# Patient Record
Sex: FEMALE | Race: WHITE | NOT HISPANIC OR LATINO | Employment: PART TIME | URBAN - NONMETROPOLITAN AREA
[De-identification: names, ages, dates, MRNs, and addresses within clinical notes are randomized per-mention and may not be internally consistent; named-entity substitution may affect disease eponyms.]

---

## 2017-09-07 ENCOUNTER — TRANSCRIBE ORDERS (OUTPATIENT)
Dept: RADIOLOGY | Facility: MEDICAL CENTER | Age: 52
End: 2017-09-07

## 2017-09-07 ENCOUNTER — APPOINTMENT (OUTPATIENT)
Dept: RADIOLOGY | Facility: MEDICAL CENTER | Age: 52
End: 2017-09-07
Payer: COMMERCIAL

## 2017-09-07 DIAGNOSIS — M79.672 FOOT PAIN, LEFT: ICD-10-CM

## 2017-09-07 DIAGNOSIS — M79.672 FOOT PAIN, LEFT: Primary | ICD-10-CM

## 2017-09-07 PROCEDURE — 73630 X-RAY EXAM OF FOOT: CPT

## 2023-05-19 ENCOUNTER — OFFICE VISIT (OUTPATIENT)
Dept: URGENT CARE | Facility: CLINIC | Age: 58
End: 2023-05-19

## 2023-05-19 VITALS
RESPIRATION RATE: 18 BRPM | TEMPERATURE: 99 F | SYSTOLIC BLOOD PRESSURE: 145 MMHG | OXYGEN SATURATION: 96 % | HEART RATE: 72 BPM | DIASTOLIC BLOOD PRESSURE: 92 MMHG

## 2023-05-19 DIAGNOSIS — N30.01 ACUTE CYSTITIS WITH HEMATURIA: ICD-10-CM

## 2023-05-19 DIAGNOSIS — R35.0 URINARY FREQUENCY: Primary | ICD-10-CM

## 2023-05-19 LAB
SL AMB  POCT GLUCOSE, UA: NEGATIVE
SL AMB LEUKOCYTE ESTERASE,UA: ABNORMAL
SL AMB POCT BILIRUBIN,UA: NEGATIVE
SL AMB POCT BLOOD,UA: ABNORMAL
SL AMB POCT CLARITY,UA: ABNORMAL
SL AMB POCT COLOR,UA: YELLOW
SL AMB POCT KETONES,UA: NEGATIVE
SL AMB POCT NITRITE,UA: POSITIVE
SL AMB POCT PH,UA: 5
SL AMB POCT SPECIFIC GRAVITY,UA: 1.01
SL AMB POCT URINE PROTEIN: NEGATIVE
SL AMB POCT UROBILINOGEN: 0.2

## 2023-05-19 RX ORDER — DIPHENOXYLATE HYDROCHLORIDE AND ATROPINE SULFATE 2.5; .025 MG/1; MG/1
1 TABLET ORAL DAILY
COMMUNITY

## 2023-05-19 RX ORDER — ESCITALOPRAM OXALATE 20 MG/1
20 TABLET ORAL DAILY
COMMUNITY
Start: 2022-10-11 | End: 2023-10-11

## 2023-05-19 RX ORDER — NITROFURANTOIN 25; 75 MG/1; MG/1
100 CAPSULE ORAL 2 TIMES DAILY
Qty: 10 CAPSULE | Refills: 0 | Status: SHIPPED | OUTPATIENT
Start: 2023-05-19 | End: 2023-05-24

## 2023-05-19 RX ORDER — IBUPROFEN 600 MG/1
600 TABLET ORAL EVERY 6 HOURS PRN
COMMUNITY
Start: 2022-06-15 | End: 2023-06-15

## 2023-05-19 RX ORDER — BUSPIRONE HYDROCHLORIDE 5 MG/1
5 TABLET ORAL 2 TIMES DAILY
COMMUNITY
Start: 2023-01-04 | End: 2024-01-04

## 2023-05-19 NOTE — PATIENT INSTRUCTIONS
Infección del tracto urinario en mujeres   LO QUE NECESITA SABER:   Jamila infección en el tracto urinario (ITU) ocurre cuando entran bacterias en grey tracto urinario  Grey tracto urinario incluye rah riñones, uréteres, vejiga y Gondregnies  Jamila infección del tracto urinario es más común in [place] parte inferior de grey tracto urinario que incluye grey vejiga y uretra  INSTRUCCIONES SOBRE [facility]ITAL[facility]:   Regrese a la eliu de emergencias si:  Usted está orinando muy poco o nada en absoluto  Usted tiene fiebre chanell con temblor y escalofríos  Usted tiene [employer] costado o en la espalda que ISRAEL  Llame a grey médico si:  Tiene fiebre  Usted no siente mejoría después de 2 días de zoë los antibióticos  Usted tiene [employer], tales elizabeth abraham o pus en la orina  Usted está vomitando  Usted tiene preguntas o inquietudes acerca de grey condición o cuidado  Medicamentos:  Los antibióticos tratan las infecciones bacterianas  Si tiene infecciones urinarias con frecuencia (llamadas recurrentes), se le pueden fauzia antibióticos para que los tome regularmente  Carrillo Skains cuándo y [employer] antibióticos  El objetivo es prevenir las infecciones urinarias, scott no causar resistencia a los antibióticos mediante el uso de antibióticos con demasiada frecuencia  Los medicamentos para disminuir el dolor y el ardor al orinar  También ayudarán a disminuir la sensación de necesitar orinar con frecuencia  Estos medicamentos podrían hacer que orine de color anaranjado o casey  Alverda rah medicamentos elizabeth se le haya indicado  Consulte con grey médico si usted anshul que grey medicamento no le está ayudando o si presenta efectos secundarios  Infórmele al médico si usted es alérgico a algún medicamento  Mantenga jamila lista actualizada de los [place], las vitaminas y los productos herbales que jaymie  Incluya los siguientes datos de los medicamentos: cantidad, frecuencia y motivo de administración   Hermina Hoist con usted la lista o los envases de las píldoras a rah citas de seguimiento  Lleve la lista de los medicamentos con usted en gordon de jamila emergencia  Acuda a la consulta de control con grey médico según las indicaciones: Anote rah preguntas para que se acuerde de hacerlas melia rah visitas  Evite otra ITU:  Vacíe la vejiga con frecuencia  Orine y vacíe la vejiga tan pronto elizabeth usted sienta la necesidad  No retenga la orina por largos períodos de [place]  Límpiese de adelante hacia atrás después de orinar o de tener jamila evacuación intestinal  [place] ayudará a evitar que los gérmenes entren en el tracto urinario a través de la uretra  [place] líquidos elizabeth se le haya indicado  Pregunte cuánto líquido debe zoë cada día y cuáles líquidos son los más adecuados para usted  Es probable que usted necesite zoë más líquidos de lo habitual para ayudar a deshacerse de la bacteria  No tome alcohol, cafeína ni jugos cítricos  Estos pueden irritar grey vejiga y aumentar rah síntomas  Grey médico puede recomendarle el jugo de arándano para prevenir jamila infección Artice Mighty  Orine antes y [address]  [place] puede ayudar a eliminar las bacterias que pasan melia el sexo  No tome duchas vaginales ni use desodorantes femeninos  Estos pueden cambiar el equilibrio químico de la vagina  [place] las compresas o los tampones a menudo  [place] ayudará a evitar que los gérmenes entren en el tracto urinario  Consulte con grey médico sobre los métodos anticonceptivos  Es posible que tenga que cambiar grey método si está aumentando grey riesgo de infecciones urinarias  Use ropa interior de algodón y ropa suelta  Los pantalones ajustados y la ropa interior de nylon pueden atrapar humedad y hacer que las bacterias crezcan  Se puede recomendar el uso de estrógeno vaginal  Gela medicamento ayuda a prevenir las infecciones urinarias en mujeres que hewitt pasado por la menopausia o que están en la perimenopausia      Realice ejercicios para los músculos pélvicos con frecuencia  Los ejercicios para los músculos pélvicos ayudan a empezar y parar de orinar  Los músculos pélvicos roxy ayudan a vaciar la vejiga más fácilmente  Apriete estos músculos firmemente melia 5 segundos elizabeth si estuviera tratando de retener el flujo de [place]  Luego relaje por 5 segundos  Aumente gradualmente a 10 segundos  Mejia 3 series de 15 repeticiones al día o elizabeth se le indique  © Copyright Honey Moni 2022 Information is for End User's use only and may not be sold, redistributed or otherwise used for commercial purposes  Esta información es sólo para uso en educación  Grey intención no es darle un consejo médico sobre enfermedades o tratamientos  Colsulte con grey Theodora Riedel farmacéutico antes de seguir cualquier régimen médico para saber si es seguro y efectivo para usted

## 2023-05-19 NOTE — PROGRESS NOTES
[facility]        NAME: Tobias Rexford is a 62 y o  female  : 1965    MRN: [id number]  DATE: May 19, 2023  TIME: 2:09 PM    Assessment and Plan   Urinary frequency [R35 0]  1  Urinary frequency  POCT urine dip    Urine culture    nitrofurantoin (MACROBID) 100 mg capsule      2  Acute cystitis with hematuria              Patient Instructions   Patient Instructions     Infección del tracto urinario en mujeres   LO QUE NECESITA SABER:   Adali infección en el tracto urinario (ITU) ocurre cuando entran bacterias en grey tracto urinario  Grey tracto urinario incluye rah riñones, uréteres, vejiga y Gondregnies  Adali infección del tracto urinario es más común in [place] parte inferior de grey tracto urinario que incluye grey vejiga y uretra  INSTRUCCIONES SOBRE [facility]ITAL[facility]IA:   Regrese a la eliu de emergencias si:  • Usted está orinando muy poco o nada en absoluto  • Usted tiene fiebre chanell con temblor y escalofríos  • Usted tiene dolor en el costado o en la espalda que ISRAEL  Llame a grey médico si:  • Tiene fiebre  • Usted no siente mejoría después de 2 días de zoë los antibióticos  • Usted tiene síntomas nuevos, tales elizabeth abraham o pus en la orina  • Usted está vomitando  • Usted tiene preguntas o inquietudes acerca de grey condición o cuidado  Medicamentos:  • Los antibióticos tratan las infecciones bacterianas  Si tiene infecciones urinarias con frecuencia (llamadas recurrentes), se le pueden fauzia antibióticos para que los tome regularmente  Nik Maid cuándo y [employer] antibióticos  El objetivo es prevenir las infecciones urinarias, scott no causar resistencia a los antibióticos mediante el uso de antibióticos con demasiada frecuencia  • Los medicamentos para disminuir el dolor y el ardor al orinar  También ayudarán a disminuir la sensación de necesitar orinar con frecuencia  Estos medicamentos podrían hacer que orine de color anaranjado o casey      • [place] rah [employer] se le haya indicado  Consulte con grey médico si usted anshul que grey medicamento no le está ayudando o si presenta efectos secundarios  Infórmele al médico si usted es alérgico a algún medicamento  Mantenga jamila lista actualizada de los [place], las vitaminas y los productos herbales que jaymie  Incluya los siguientes datos de los medicamentos: cantidad, frecuencia y motivo de administración  Traiga con usted la lista o los envases de las píldoras a rah citas de seguimiento  Lleve la lista de los medicamentos con usted en gordon de jamila emergencia  Acuda a la consulta de control con grey médico según las indicaciones: Anote rah preguntas para que se acuerde de hacerlas melia rah visitas  Evite otra ITU:  • Vacíe la vejiga con frecuencia  Orine y vacíe la vejiga tan pronto elizabeth usted sienta la necesidad  No retenga la orina por largos períodos de [place]  • Límpiese de adelante hacia atrás después de orinar o de tener jamila evacuación intestinal  [place] ayudará a evitar que los gérmenes entren en el tracto urinario a través de la uretra  • [place] líquidos elizabeth se le haya indicado  Pregunte cuánto líquido debe zoë cada día y cuáles líquidos son los más adecuados para usted  Es probable que usted necesite zoë más líquidos de lo habitual para ayudar a deshacerse de la bacteria  No tome alcohol, cafeína ni jugos cítricos  Estos pueden irritar grey vejiga y aumentar rah síntomas  Grey médico puede recomendarle el jugo de arándano para prevenir jamila infección [place]  • Orine antes y [address]  [place] puede ayudar a eliminar las bacterias que pasan melia el sexo  • No tome duchas vaginales ni use desodorantes femeninos  Estos pueden cambiar el equilibrio químico de la vagina  • [place] las compresas o los tampones a menudo  [place] ayudará a evitar que los gérmenes entren en el tracto urinario  • Consulte con grey médico sobre los métodos anticonceptivos   Es posible que tenga que [place] grey método si está aumentando grey riesgo de infecciones urinarias  • Use ropa interior de algodón y ropa suelta  Los pantalones ajustados y la ropa interior de nylon pueden atrapar humedad y hacer que las bacterias crezcan  • Se puede recomendar el uso de estrógeno vaginal  Gela medicamento ayuda a prevenir las infecciones urinarias en mujeres que hewitt pasado por la menopausia o que están en la perimenopausia  • Realice ejercicios para los músculos pélvicos con frecuencia  Los ejercicios para los músculos pélvicos ayudan a empezar y parar de orinar  Los músculos pélvicos roxy ayudan a vaciar la vejiga más fácilmente  Apriete estos músculos firmemente melia 5 segundos elizabeth si estuviera tratando de retener el flujo de [place]  Luego relaje por 5 segundos  Aumente gradualmente a 10 segundos  Mejia 3 series de 15 repeticiones al día o elizabeth se le indique  © Copyright Warren Maid 2022 Information is for End User's use only and may not be sold, redistributed or otherwise used for commercial purposes  Esta información es sólo para uso en educación  Grey intención no es darle un consejo médico sobre enfermedades o tratamientos  Colsulte con grey Tai Pattee farmacéutico antes de seguir cualquier régimen médico para saber si es seguro y efectivo para usted  Follow up with PCP in 3-5 days  Proceed to  ER if symptoms worsen  Chief Complaint     Chief Complaint   Patient presents with   • Urinary Frequency     Blood in urine, cramping, started yesterday         History of Present Illness       The patient is a 55-year-old female who presents the clinic complaining of urinary frequency and blood in the past few days  She states that she does have a history of kidney stones    She states her last kidney stone was in December and she supposed to be seeing urologist but lost her insurance so she is awaiting an appointment for a new urologist       Review of Systems   Review of Systems   Constitutional: Negative for chills and fever  Gastrointestinal: Negative for abdominal distention, blood in stool and constipation  Genitourinary: Positive for frequency, hematuria and urgency  Negative for decreased urine volume, difficulty urinating, dyspareunia, dysuria, enuresis, flank pain, pelvic pain, vaginal bleeding, vaginal discharge and vaginal pain  Neurological: Negative for dizziness and headaches  Current Medications       Current Outpatient Medications:   •  busPIRone (BUSPAR) 5 mg tablet, Take 5 mg by mouth 2 (two) times a day, Disp: , Rfl:   •  Cholecalciferol 125 MCG (5000 UT) capsule, Take by mouth, Disp: , Rfl:   •  escitalopram (LEXAPRO) 20 mg tablet, Take 20 mg by mouth daily, Disp: , Rfl:   •  ibuprofen (MOTRIN) 600 mg tablet, Take 600 mg by mouth every 6 (six) hours as needed, Disp: , Rfl:   •  multivitamin (THERAGRAN) TABS, Take 1 tablet by mouth daily, Disp: , Rfl:   •  nitrofurantoin (MACROBID) 100 mg capsule, Take 1 capsule (100 mg total) by mouth 2 (two) times a day for 5 days, Disp: 10 capsule, Rfl: 0  •  Cholecalciferol 25 MCG (1000 UT) CHEW, Chew 1,000 mg daily (Patient not taking: Reported on 5/19/2023), Disp: , Rfl:     Current Allergies     Allergies as of 05/19/2023   • (No Known Allergies)            The following portions of the patient's history were reviewed and updated as appropriate: allergies, current medications, past family history, past medical history, past social history, past surgical history and problem list      Past Medical History:   Diagnosis Date   • Anxiety        History reviewed  No pertinent surgical history  History reviewed  No pertinent family history  Medications have been verified  Objective   /92   Pulse 72   Temp 99 °F (37 2 °C)   Resp 18   SpO2 96%        Physical Exam     Physical Exam  Constitutional:       Appearance: She is well-developed  She is not diaphoretic  HENT:      Head: Normocephalic        Right Ear: Tympanic membrane normal       Left Ear: Tympanic membrane normal       Nose: No congestion or rhinorrhea  Eyes:      General:         Right eye: No discharge  Left eye: No discharge  Pupils: Pupils are equal, round, and reactive to light  Neck:      Thyroid: No thyromegaly  Cardiovascular:      Rate and Rhythm: Normal rate  Heart sounds: No murmur heard  Pulmonary:      Effort: Pulmonary effort is normal  No respiratory distress  Breath sounds: No wheezing or rales  Chest:      Chest wall: No tenderness  Abdominal:      General: There is no distension  Palpations: Abdomen is soft  Tenderness: There is abdominal tenderness in the suprapubic area  There is no right CVA tenderness, left CVA tenderness, guarding or rebound  Negative signs include Kim's sign, Rovsing's sign, McBurney's sign, psoas sign and obturator sign  Musculoskeletal:         General: Normal range of motion  Cervical back: Normal range of motion  Lymphadenopathy:      Cervical: No cervical adenopathy  Skin:     General: Skin is warm  Neurological:      Mental Status: She is alert and oriented to person, place, and time  Urine dip indicates a UTI  I will treat with Macrobid    I suggest follow-up with PCP or go to the ER if symptoms worsen

## 2023-05-21 LAB — BACTERIA UR CULT: ABNORMAL

## 2023-05-24 ENCOUNTER — TELEPHONE (OUTPATIENT)
Dept: URGENT CARE | Facility: CLINIC | Age: 58
End: 2023-05-24

## 2023-05-24 NOTE — TELEPHONE ENCOUNTER
Patient returned call from other providers message unsure of why he called  Upon chart review discussed with patient that her labs came back and she did have a UTI and is on the appropriate antibiotic and per the note from Jaylen Sullivan PA-C is to follow with her PCP if her symptoms fail to improve  Patient verbalized resolution of her symptoms and that she finished antibiotic this morning

## 2023-08-24 ENCOUNTER — OFFICE VISIT (OUTPATIENT)
Dept: URGENT CARE | Facility: CLINIC | Age: 58
End: 2023-08-24
Payer: COMMERCIAL

## 2023-08-24 VITALS
RESPIRATION RATE: 18 BRPM | TEMPERATURE: 98.5 F | DIASTOLIC BLOOD PRESSURE: 95 MMHG | OXYGEN SATURATION: 97 % | SYSTOLIC BLOOD PRESSURE: 166 MMHG | BODY MASS INDEX: 32.39 KG/M2 | HEART RATE: 94 BPM | HEIGHT: 62 IN | WEIGHT: 176 LBS

## 2023-08-24 DIAGNOSIS — S50.862A INSECT BITE OF LEFT FOREARM, INITIAL ENCOUNTER: Primary | ICD-10-CM
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
## 2023-08-24 PROCEDURE — 99213 OFFICE O/P EST LOW 20 MIN: CPT

## 2023-08-24 RX ORDER — LORATADINE 10 MG/1
10 TABLET ORAL DAILY
Qty: 7 TABLET | Refills: 0 | Status: SHIPPED | OUTPATIENT
Start: 2023-08-24

## 2023-08-24 RX ORDER — CEPHALEXIN 500 MG/1
500 CAPSULE ORAL EVERY 12 HOURS SCHEDULED
Qty: 14 CAPSULE | Refills: 0 | Status: SHIPPED | OUTPATIENT
Start: 2023-08-24 | End: 2023-08-31

## 2023-08-24 NOTE — PROGRESS NOTES
[facility]        NAME: Ray Live is a 62 y.o. female  : 1965    MRN: [id number]  DATE: 2023  TIME: 4:29 PM    Assessment and Plan   Insect bite of left forearm, initial encounter Jimmie Tomas. XXXA]  1. Insect bite of left forearm, initial encounter  cephalexin (KEFLEX) 500 mg capsule    loratadine (CLARITIN) 10 mg tablet          Insect bite. Will treat with keflex and claritin. Patient Instructions     You have been prescribed an antibiotic. Take antibiotic as directed for the full duration. Do not stop the antibiotics just because you are feeling better. Side effects of antibiotics include diarrhea. Eat yogurt or take a probiotic or acidophilus tablet while taking this medication to help prevent diarrhea and replenish good gut bacteria. Follow up with PCP in 3-5 days. Proceed to  ER if symptoms worsen. Chief Complaint     Chief Complaint   Patient presents with   • Insect Bite         History of Present Illness       Patient is a 62year old female who presents to the office today for an insect bite to the left forearm. States she was in her garden yesterday when she felt something cause an itch to her forearm, thought it was a mosquito bite but this morning it was red and inflamed and caused some swelling of her left arm. Denies pain or pruritis. Review of Systems   Review of Systems   Skin: Positive for color change and wound. All other systems reviewed and are negative.         Current Medications       Current Outpatient Medications:   •  cephalexin (KEFLEX) 500 mg capsule, Take 1 capsule (500 mg total) by mouth every 12 (twelve) hours for 7 days, Disp: 14 capsule, Rfl: 0  •  loratadine (CLARITIN) 10 mg tablet, Take 1 tablet (10 mg total) by mouth daily, Disp: 7 tablet, Rfl: 0  •  busPIRone (BUSPAR) 5 mg tablet, Take 5 mg by mouth 2 (two) times a day, Disp: , Rfl:   •  Cholecalciferol 125 MCG (5000 UT) capsule, Take by mouth, Disp: , Rfl:   •  Cholecalciferol 25 MCG (1000 UT) CHEW, Chew 1,000 mg daily (Patient not taking: Reported on 5/19/2023), Disp: , Rfl:   •  escitalopram (LEXAPRO) 20 mg tablet, Take 20 mg by mouth daily, Disp: , Rfl:   •  ibuprofen (MOTRIN) 600 mg tablet, Take 600 mg by mouth every 6 (six) hours as needed, Disp: , Rfl:   •  multivitamin (THERAGRAN) TABS, Take 1 tablet by mouth daily, Disp: , Rfl:     Current Allergies     Allergies as of 08/24/2023   • (No Known Allergies)            The following portions of the patient's history were reviewed and updated as appropriate: allergies, current medications, past family history, past medical history, past social history, past surgical history and problem list.     Past Medical History:   Diagnosis Date   • Anxiety        History reviewed. No pertinent surgical history. History reviewed. No pertinent family history. Medications have been verified. Objective   /95   Pulse 94   Temp 98.5 °F (36.9 °C)   Resp 18   Ht 5' 2" (1.575 m)   Wt 79.8 kg (176 lb)   SpO2 97%   BMI 32.19 kg/m²        Physical Exam     Physical Exam  Vitals and nursing note reviewed. Constitutional:       Appearance: Normal appearance. She is normal weight. Cardiovascular:      Rate and Rhythm: Normal rate and regular rhythm. Pulses: Normal pulses. Heart sounds: Normal heart sounds. Pulmonary:      Effort: Pulmonary effort is normal.      Breath sounds: Normal breath sounds. Musculoskeletal:         General: Swelling present. No tenderness or signs of injury. Skin:     Capillary Refill: Capillary refill takes less than 2 seconds. Findings: Erythema present. Neurological:      Mental Status: She is alert.

## 2023-08-24 NOTE — PATIENT INSTRUCTIONS
You have been prescribed an antibiotic. Take antibiotic as directed for the full duration. Do not stop the antibiotics just because you are feeling better. Side effects of antibiotics include diarrhea. Eat yogurt or take a probiotic or acidophilus tablet while taking this medication to help prevent diarrhea and replenish good gut bacteria.

## 2025-05-22 ENCOUNTER — APPOINTMENT (OUTPATIENT)
Dept: RADIOLOGY | Facility: CLINIC | Age: 60
End: 2025-05-22
Attending: PHYSICIAN ASSISTANT
Payer: COMMERCIAL

## 2025-05-22 ENCOUNTER — OFFICE VISIT (OUTPATIENT)
Dept: URGENT CARE | Facility: CLINIC | Age: 60
End: 2025-05-22
Payer: COMMERCIAL

## 2025-05-22 VITALS
WEIGHT: 182 LBS | OXYGEN SATURATION: 100 % | TEMPERATURE: 98.5 F | HEART RATE: 90 BPM | DIASTOLIC BLOOD PRESSURE: 99 MMHG | SYSTOLIC BLOOD PRESSURE: 175 MMHG | RESPIRATION RATE: 18 BRPM | BODY MASS INDEX: 33.29 KG/M2

## 2025-05-22 DIAGNOSIS — M46.1 SACROILIITIS (HCC): ICD-10-CM

## 2025-05-22 DIAGNOSIS — M25.551 ACUTE RIGHT HIP PAIN: ICD-10-CM

## 2025-05-22 DIAGNOSIS — M25.551 ACUTE RIGHT HIP PAIN: Primary | ICD-10-CM

## 2025-05-22 PROBLEM — Z11.51 SCREENING FOR HPV (HUMAN PAPILLOMAVIRUS): Status: RESOLVED | Noted: 2024-01-16 | Resolved: 2025-05-22

## 2025-05-22 PROBLEM — D72.820 LYMPHOCYTOSIS: Status: ACTIVE | Noted: 2020-01-14

## 2025-05-22 PROBLEM — F32.A DEPRESSION: Status: ACTIVE | Noted: 2022-10-11

## 2025-05-22 PROBLEM — M79.672 CHRONIC FOOT PAIN, LEFT: Status: ACTIVE | Noted: 2023-01-06

## 2025-05-22 PROBLEM — F41.9 ANXIETY: Status: ACTIVE | Noted: 2022-10-11

## 2025-05-22 PROBLEM — N95.0 PMB (POSTMENOPAUSAL BLEEDING): Status: ACTIVE | Noted: 2024-01-16

## 2025-05-22 PROBLEM — Z12.31 ENCOUNTER FOR SCREENING MAMMOGRAM FOR MALIGNANT NEOPLASM OF BREAST: Status: RESOLVED | Noted: 2024-01-16 | Resolved: 2025-05-22

## 2025-05-22 PROBLEM — I10 HYPERTENSION, ESSENTIAL: Status: ACTIVE | Noted: 2024-04-02

## 2025-05-22 PROBLEM — E55.9 VITAMIN D DEFICIENCY: Status: ACTIVE | Noted: 2018-07-26

## 2025-05-22 PROBLEM — G89.29 CHRONIC FOOT PAIN, LEFT: Status: ACTIVE | Noted: 2023-01-06

## 2025-05-22 PROBLEM — F41.1 GENERALIZED ANXIETY DISORDER: Status: ACTIVE | Noted: 2018-07-26

## 2025-05-22 PROCEDURE — 99214 OFFICE O/P EST MOD 30 MIN: CPT | Performed by: PHYSICIAN ASSISTANT

## 2025-05-22 PROCEDURE — 73502 X-RAY EXAM HIP UNI 2-3 VIEWS: CPT

## 2025-05-22 RX ORDER — METHOCARBAMOL 500 MG/1
500 TABLET, FILM COATED ORAL 3 TIMES DAILY
Qty: 10 TABLET | Refills: 0 | Status: SHIPPED | OUTPATIENT
Start: 2025-05-22

## 2025-05-22 RX ORDER — ROPINIROLE 0.25 MG/1
0.25 TABLET, FILM COATED ORAL 3 TIMES DAILY
COMMUNITY

## 2025-05-22 RX ORDER — LISINOPRIL 10 MG/1
10 TABLET ORAL DAILY
COMMUNITY

## 2025-05-22 RX ORDER — METHYLPREDNISOLONE 4 MG/1
TABLET ORAL
Qty: 1 EACH | Refills: 0 | Status: SHIPPED | OUTPATIENT
Start: 2025-05-22

## 2025-05-22 RX ORDER — PANTOPRAZOLE SODIUM 40 MG/1
40 TABLET, DELAYED RELEASE ORAL DAILY
COMMUNITY
Start: 2025-05-16 | End: 2026-05-16

## 2025-05-22 NOTE — PATIENT INSTRUCTIONS
Start robaxin 3 tablets by mouth 3 times a day for 3 days. May cause drowsiness. Do not operate heavy machinery or drive a vehicle if it causes drowsiness.  Start medrol dose pack. Advised patient to take each day's allotment of tablets all together in the morning with food to avoid upset stomach and/or insomnia. Take with a full glass of water.   Perform gentle range of motion exercises daily.   Recommend ibuprofen 200-400mg PO Q6 hours PRN with food. Stressed importance of not taking ibuprofen on an empty stomach.   Continue to follow with your provider who is investigating back issues.

## 2025-05-22 NOTE — LETTER
May 22, 2025     Patient: Jordyn oSw   YOB: 1965   Date of Visit: 5/22/2025       To Whom it May Concern:    Jordyn Sow was seen in my clinic on 5/22/2025. She may return to work on 5/23/2025.    If you have any questions or concerns, please don't hesitate to call.         Sincerely,          Danielle Lee Seiple, PA-C        CC: No Recipients

## 2025-05-22 NOTE — PROGRESS NOTES
[facility]        NAME: Jordyn Sow is a 60 y.o. female  : 1965    MRN: [id number]  DATE: May 22, 2025  TIME: 6:14 PM    Assessment and Plan   Acute right hip pain [M25.551]  1. Acute right hip pain  XR hip/pelv 2-3 vws right if performed    Ambulatory Referral to [facility]      2. Sacroiliitis (HCC)  methocarbamol (ROBAXIN) 500 mg tablet    methylPREDNISolone 4 MG tablet therapy pack    Ambulatory Referral to [facility]        Right hip xray wet read- no acute fracture or osseous malformation. Will await formal radiology read.   Start robaxin 3 tablets by mouth 3 times a day for 3 days. May cause drowsiness. Do not operate heavy machinery or drive a vehicle if it causes drowsiness.  Start medrol dose pack. Advised patient to take each day's allotment of tablets all together in the morning with food to avoid upset stomach and/or insomnia. Take with a full glass of water.   Perform gentle range of motion exercises daily.   Recommend ibuprofen 200-400mg PO Q6 hours PRN with food. Stressed importance of not taking ibuprofen on an empty stomach.     Patient Instructions       Follow up with PCP in 3-5 days.  Proceed to  ER if symptoms worsen.    If tests have been performed at [facility], our office will contact you with results if changes need to be made to the care plan discussed with you at the visit.  You can review your full results on [facility] MyChart.    Chief Complaint     Chief Complaint   Patient presents with    Back Pain     Lower right back pain          History of Present Illness       Jordyn has significant PMH of neuropthy, chronic left foot pain, who presents for evaluation of right sided low back pain x 3 days. 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 Patient reports sitting alleviates pain. Walking aggravates pain. Pain is described as a stiffness.   When the patient bent over to get her shoes yesterday, she felt intense  pain.   She has tried motrin and it helped slightly. Has been applying arthritis gel over the area with some improvement.   Normal appetite, bowel habits.  Denies fever, cough, congestion, injuries.       Back Pain  Pertinent negatives include no abdominal pain or fever.       Review of Systems   Review of Systems   Constitutional:  Negative for activity change, appetite change, fatigue and fever.   HENT:  Negative for congestion, ear pain, rhinorrhea, sinus pressure, sinus pain, sneezing, sore throat and trouble swallowing.    Eyes:  Negative for discharge and redness.   Respiratory:  Negative for cough, shortness of breath and wheezing.    Gastrointestinal:  Negative for abdominal pain, constipation, diarrhea, nausea and vomiting.   Musculoskeletal:  Positive for back pain.   Skin:  Negative for rash.         Current Medications     Current Medications[1]    Current Allergies     Allergies as of 05/22/2025    (No Known Allergies)            The following portions of the patient's history were reviewed and updated as appropriate: allergies, current medications, past family history, past medical history, past social history, past surgical history and problem list.     Past Medical History[2]    Past Surgical History[3]    Family History[4]      Medications have been verified.        Objective   BP (!) 175/99   Pulse 90   Temp 98.5 °F (36.9 °C)   Resp 18   Wt 82.6 kg (182 lb)   SpO2 100%   BMI 33.29 kg/m²   No LMP recorded.       Physical Exam     Physical Exam  Vitals and nursing note reviewed.   Constitutional:       General: She is awake.      Appearance: Normal appearance. She is well-developed, well-groomed and normal weight. She is not ill-appearing.   HENT:      Head: Normocephalic.      Right Ear: External ear normal.      Left Ear: External ear normal.      Nose: Nose normal. No nasal deformity.      Mouth/Throat:      Lips: Pink. No lesions.      Mouth: Mucous membranes are moist.     Eyes:       General: Lids are normal.      Conjunctiva/sclera: Conjunctivae normal.      Pupils: Pupils are equal, round, and reactive to light.     Neck:      Thyroid: No thyromegaly.     Cardiovascular:      Rate and Rhythm: Normal rate and regular rhythm.      Heart sounds: Normal heart sounds. No murmur heard.  Pulmonary:      Effort: Pulmonary effort is normal.      Breath sounds: Normal breath sounds. No decreased breath sounds, wheezing, rhonchi or rales.   Abdominal:      General: Bowel sounds are normal.      Palpations: Abdomen is soft.      Tenderness: There is no abdominal tenderness.      Hernia: No hernia is present.     Musculoskeletal:      Cervical back: Normal, normal range of motion and neck supple.      Thoracic back: Normal.      Lumbar back: Tenderness (over right SI joint) present. No spasms or bony tenderness. Normal range of motion. Negative right straight leg raise test and negative left straight leg raise test.      Right hip: Tenderness (over right SI joint) present. Normal range of motion.      Left hip: No tenderness. Normal range of motion.   Lymphadenopathy:      Head:      Right side of head: No submandibular, tonsillar, preauricular or posterior auricular adenopathy.      Left side of head: No submandibular, tonsillar, preauricular or posterior auricular adenopathy.      Cervical: No cervical adenopathy.     Skin:     General: Skin is warm and dry.      Capillary Refill: Capillary refill takes less than 2 seconds.      Coloration: Skin is not pale.      Findings: No rash.     Neurological:      Mental Status: She is alert and oriented to person, place, and time.      Comments: CN II-X grossly intact.   Psychiatric:         Speech: Speech normal.         Behavior: Behavior normal. Behavior is cooperative.                        [1]   Current Outpatient Medications:     busPIRone (BUSPAR) 5 mg tablet, Take 5 mg by mouth in the morning and 5 mg in the evening., Disp: , Rfl:     escitalopram  (LEXAPRO) 20 mg tablet, Take 20 mg by mouth in the morning., Disp: , Rfl:     lisinopril (ZESTRIL) 10 mg tablet, Take 10 mg by mouth daily, Disp: , Rfl:     methocarbamol (ROBAXIN) 500 mg tablet, Take 1 tablet (500 mg total) by mouth 3 (three) times a day, Disp: 10 tablet, Rfl: 0    methylPREDNISolone 4 MG tablet therapy pack, Use as directed on package, Disp: 1 each, Rfl: 0    pantoprazole (PROTONIX) 40 mg tablet, Take 40 mg by mouth daily, Disp: , Rfl:     rOPINIRole (REQUIP) 0.25 mg tablet, Take 0.25 mg by mouth 3 (three) times a day, Disp: , Rfl:     Cholecalciferol 125 MCG (5000 UT) capsule, Take by mouth, Disp: , Rfl:     Cholecalciferol 25 MCG (1000 UT) CHEW, Chew 1,000 mg daily (Patient not taking: Reported on 5/19/2023), Disp: , Rfl:     ibuprofen (MOTRIN) 600 mg tablet, Take 600 mg by mouth every 6 (six) hours as needed, Disp: , Rfl:     loratadine (CLARITIN) 10 mg tablet, Take 1 tablet (10 mg total) by mouth daily, Disp: 7 tablet, Rfl: 0    multivitamin (THERAGRAN) TABS, Take 1 tablet by mouth daily, Disp: , Rfl:   [2]   Past Medical History:  Diagnosis Date    Anxiety     Encounter for screening mammogram for malignant neoplasm of breast 01/16/2024   [3] No past surgical history on file.  [4] No family history on file.

## 2025-05-23 ENCOUNTER — RESULTS FOLLOW-UP (OUTPATIENT)
Dept: URGENT CARE | Facility: CLINIC | Age: 60
End: 2025-05-23